# Patient Record
Sex: MALE | Race: WHITE | Employment: FULL TIME | URBAN - METROPOLITAN AREA
[De-identification: names, ages, dates, MRNs, and addresses within clinical notes are randomized per-mention and may not be internally consistent; named-entity substitution may affect disease eponyms.]

---

## 2017-11-03 ENCOUNTER — OFFICE VISIT (OUTPATIENT)
Dept: INTERNAL MEDICINE CLINIC | Facility: CLINIC | Age: 27
End: 2017-11-03

## 2017-11-03 VITALS
BODY MASS INDEX: 31.08 KG/M2 | HEART RATE: 68 BPM | HEIGHT: 67 IN | SYSTOLIC BLOOD PRESSURE: 122 MMHG | DIASTOLIC BLOOD PRESSURE: 78 MMHG | TEMPERATURE: 99 F | WEIGHT: 198 LBS

## 2017-11-03 DIAGNOSIS — Z00.00 ANNUAL PHYSICAL EXAM: Primary | ICD-10-CM

## 2017-11-03 PROCEDURE — 99385 PREV VISIT NEW AGE 18-39: CPT | Performed by: INTERNAL MEDICINE

## 2017-11-03 NOTE — PROGRESS NOTES
HPI:    Patient ID: Irving Evelin is a 27year old male. HPI  Patient is here for physical , no complaints , no chest pain , no shortness of breath . No change in bowel habits no swelling, no light headedness, no visual changes. No surgeries.    Past S Conjunctivae and EOM are normal. Pupils are equal, round, and reactive to light. Right eye exhibits no discharge. Left eye exhibits no discharge. No scleral icterus. Neck: Normal range of motion. Neck supple. No JVD present.  No tracheal deviation present Referrals:  None       #[id number]

## 2017-11-06 ENCOUNTER — NURSE ONLY (OUTPATIENT)
Dept: INTERNAL MEDICINE CLINIC | Facility: CLINIC | Age: 27
End: 2017-11-06

## 2017-11-06 DIAGNOSIS — Z11.3 SCREENING EXAMINATION FOR STD (SEXUALLY TRANSMITTED DISEASE): ICD-10-CM

## 2017-11-06 DIAGNOSIS — Z00.00 ANNUAL PHYSICAL EXAM: Primary | ICD-10-CM

## 2017-11-06 PROCEDURE — 36415 COLL VENOUS BLD VENIPUNCTURE: CPT | Performed by: INTERNAL MEDICINE

## 2017-11-06 NOTE — PROGRESS NOTES
NAME AND  VERIFIED. NO KNOWN ALLERGIES,  PATIENT HERE FOR BLOODWORK. SPECIMEN COLLECTED AND SENT TO LAB.

## 2018-12-27 ENCOUNTER — TELEPHONE (OUTPATIENT)
Dept: INTERNAL MEDICINE CLINIC | Facility: CLINIC | Age: 28
End: 2018-12-27

## 2018-12-27 NOTE — TELEPHONE ENCOUNTER
Patients mother called requesting a referral for her son Karla Good. Ms Deirdre Paul would like to speak specifically only to Dr. Meagan Peterson.

## 2019-01-03 ENCOUNTER — OFFICE VISIT (OUTPATIENT)
Dept: INTERNAL MEDICINE CLINIC | Facility: CLINIC | Age: 29
End: 2019-01-03
Payer: COMMERCIAL

## 2019-01-03 VITALS
WEIGHT: 199 LBS | HEIGHT: 67 IN | SYSTOLIC BLOOD PRESSURE: 120 MMHG | HEART RATE: 71 BPM | OXYGEN SATURATION: 99 % | BODY MASS INDEX: 31.23 KG/M2 | TEMPERATURE: 98 F | RESPIRATION RATE: 19 BRPM | DIASTOLIC BLOOD PRESSURE: 89 MMHG

## 2019-01-03 DIAGNOSIS — E66.3 OVERWEIGHT FOR HEIGHT: ICD-10-CM

## 2019-01-03 DIAGNOSIS — Z72.820 POOR SLEEP: ICD-10-CM

## 2019-01-03 DIAGNOSIS — R10.814 LEFT LOWER QUADRANT ABDOMINAL TENDERNESS WITHOUT REBOUND TENDERNESS: ICD-10-CM

## 2019-01-03 DIAGNOSIS — R03.0 BORDERLINE HIGH BLOOD PRESSURE: Primary | ICD-10-CM

## 2019-01-03 DIAGNOSIS — R10.816 EPIGASTRIC ABDOMINAL TENDERNESS WITHOUT REBOUND TENDERNESS: ICD-10-CM

## 2019-01-03 DIAGNOSIS — R71.8 MICROCYTOSIS: ICD-10-CM

## 2019-01-03 DIAGNOSIS — E78.1 HYPERTRIGLYCERIDEMIA: ICD-10-CM

## 2019-01-03 DIAGNOSIS — R73.9 HYPERGLYCEMIA: ICD-10-CM

## 2019-01-03 PROCEDURE — 99214 OFFICE O/P EST MOD 30 MIN: CPT | Performed by: INTERNAL MEDICINE

## 2019-01-03 NOTE — PROGRESS NOTES
Gonzalo Frank is a 25year old male. Patient presents with:  Blood Pressure: pt presents to clinic c/o elevated BP       HPI:            1 month headaches. Poor sleep last 2 months. Jeannette Pencil Up later than usual, unable to fall asleep.   Wakes up early in [place] denies excessive bruising or bleeding  PSYCH: denies depression or anxiety    Physical Exam:   01/03/19  0916 01/03/19  1859   BP: 118/86 120/89   Pulse: 71    Resp: 19    Temp: 98.4 °F (36.9 °C)    TempSrc: Oral    SpO2: 99%    Weight: 199 lb    Height: 6 mL/min/1.73m2    eGFR AFRICAN AMERICAN 139 > OR = 60 mL/min/1.73m2    BUN/CREATININE RATIO NOT APPLICABLE 6 - 22 (calc)    SODIUM 139 135 - 146 mmol/L    POTASSIUM 4.7 3.5 - 5.3 mmol/L    CHLORIDE 103 98 - 110 mmol/L    CARBON DIOXIDE 28 20 - 31 mmol/L in weight since November 2017. Advised of overweight status, small portions.   Consider dietitian after labs done    (E78.1) Hypertriglyceridemia  Plan: LIPID PANEL, URINALYSIS, ROUTINE       Decrease carbohydrates, sweets    (R71.8) Microcytosis  Plan: IR

## 2019-01-22 ENCOUNTER — HOSPITAL ENCOUNTER (OUTPATIENT)
Dept: CV DIAGNOSTICS | Facility: HOSPITAL | Age: 29
Discharge: HOME | End: 2019-01-22
Attending: INTERNAL MEDICINE
Payer: COMMERCIAL

## 2019-01-22 DIAGNOSIS — R03.0 BORDERLINE HIGH BLOOD PRESSURE: ICD-10-CM

## 2019-01-22 PROCEDURE — 93306 TTE W/DOPPLER COMPLETE: CPT | Performed by: INTERNAL MEDICINE

## 2019-01-31 ENCOUNTER — OFFICE VISIT (OUTPATIENT)
Dept: INTERNAL MEDICINE CLINIC | Facility: CLINIC | Age: 29
End: 2019-01-31
Payer: COMMERCIAL

## 2019-01-31 VITALS
WEIGHT: 197 LBS | HEART RATE: 89 BPM | SYSTOLIC BLOOD PRESSURE: 122 MMHG | BODY MASS INDEX: 30.92 KG/M2 | TEMPERATURE: 98 F | RESPIRATION RATE: 20 BRPM | HEIGHT: 67 IN | DIASTOLIC BLOOD PRESSURE: 82 MMHG | OXYGEN SATURATION: 100 %

## 2019-01-31 DIAGNOSIS — D56.9 THALASSEMIA, UNSPECIFIED TYPE: ICD-10-CM

## 2019-01-31 DIAGNOSIS — R73.9 BORDERLINE HYPERGLYCEMIA: ICD-10-CM

## 2019-01-31 DIAGNOSIS — E78.1 HYPERTRIGLYCERIDEMIA: ICD-10-CM

## 2019-01-31 DIAGNOSIS — E66.3 PATIENT OVERWEIGHT: ICD-10-CM

## 2019-01-31 DIAGNOSIS — R74.8 ELEVATED LIVER ENZYMES: ICD-10-CM

## 2019-01-31 DIAGNOSIS — I51.7 LVH (LEFT VENTRICULAR HYPERTROPHY): Primary | ICD-10-CM

## 2019-01-31 PROCEDURE — 99214 OFFICE O/P EST MOD 30 MIN: CPT | Performed by: INTERNAL MEDICINE

## 2019-01-31 NOTE — PATIENT INSTRUCTIONS
Eating Heart-Healthy Foods  Eating has a big impact on your heart health. In fact, eating healthier can improve several of your heart risks at once. For instance, it helps you manage weight, cholesterol, and blood pressure.  Here are ideas to help you tamar Aim to make these foods staples of your diet. If you have diabetes, you may have different recommendations than what is listed here:  · Fruits and vegetables provide plenty of nutrients without a lot of calories.  At meals, fill half your plate with these f · Choose ingredients that spice up your food without adding calories, fat, or sodium. Try these items: horseradish, hot sauce, lemon, mustard, nonfat salad dressings, and vinegar.  For salt-free herbs and spices, try basil, cilantro, cinnamon, pepper, and r High cholesterol or triglycerides All men ages 28 and older, and younger men at high risk for coronary artery disease At least every 5 years   HIV All men At routine exams   Obesity All men in this age group At routine exams   Syphilis Men at increased ris Pneumococca (PCV13) and Pneumococcal (PPSV23) Men at increased risk for infection – talk with your healthcare provider PCV13: 1 dose ages 23 to 72 (protects against 13 types of pneumococcal bacteria)  PPSV23: 1 to 2 doses through age 59, or 1 dose at 72 or

## 2019-01-31 NOTE — PROGRESS NOTES
Hillary Rivera is a 25year old male. Patient presents with: Follow - Up: pt presents to clinic for follow up on Echo results + BP       HPI:       Sleep better, less cola. Denies history of snoring. Having a healthier diet with more vegetables.   Ch air movement  Heart-S1-S2 normal, no S3, Gr 1-9/3 systolic murmur. Rhythm regular. Abdomen-bowel sounds normal. Liver palpated below right costal margin. No tenderness to palpation. No masses.   Extremities-no edema    Objectives:  Results for orders danita HDL CHOLESTEROL 33 (L) >40 mg/dL    TRIGLYCERIDES 236 (H) <150 mg/dL    LDL-CHOLESTEROL 99 mg/dL (calc)    CHOL/HDLC RATIO 5.1 (H) <5.0 (calc)    NON-HDL CHOLESTEROL 135 (H) <130 mg/dL (calc)   HIV AG AB COMBO   Result Value Ref Range    HIV AG/AB, 4TH file.        Fay Shen MD

## 2019-04-08 ENCOUNTER — OFFICE VISIT (OUTPATIENT)
Dept: INTERNAL MEDICINE CLINIC | Facility: CLINIC | Age: 29
End: 2019-04-08
Payer: COMMERCIAL

## 2019-04-08 VITALS
RESPIRATION RATE: 19 BRPM | BODY MASS INDEX: 31.23 KG/M2 | DIASTOLIC BLOOD PRESSURE: 82 MMHG | HEART RATE: 61 BPM | WEIGHT: 199 LBS | HEIGHT: 67 IN | OXYGEN SATURATION: 100 % | TEMPERATURE: 98 F | SYSTOLIC BLOOD PRESSURE: 120 MMHG

## 2019-04-08 DIAGNOSIS — R03.0 BORDERLINE HIGH BLOOD PRESSURE: ICD-10-CM

## 2019-04-08 DIAGNOSIS — I51.7 LVH (LEFT VENTRICULAR HYPERTROPHY): ICD-10-CM

## 2019-04-08 DIAGNOSIS — D56.9 THALASSEMIA, UNSPECIFIED TYPE: ICD-10-CM

## 2019-04-08 DIAGNOSIS — R74.8 ELEVATED LIVER ENZYMES: ICD-10-CM

## 2019-04-08 DIAGNOSIS — Z00.00 ANNUAL PHYSICAL EXAM: Primary | ICD-10-CM

## 2019-04-08 PROCEDURE — 84466 ASSAY OF TRANSFERRIN: CPT | Performed by: INTERNAL MEDICINE

## 2019-04-08 PROCEDURE — 82607 VITAMIN B-12: CPT | Performed by: INTERNAL MEDICINE

## 2019-04-08 PROCEDURE — 86706 HEP B SURFACE ANTIBODY: CPT | Performed by: INTERNAL MEDICINE

## 2019-04-08 PROCEDURE — 36415 COLL VENOUS BLD VENIPUNCTURE: CPT | Performed by: INTERNAL MEDICINE

## 2019-04-08 PROCEDURE — 80061 LIPID PANEL: CPT | Performed by: INTERNAL MEDICINE

## 2019-04-08 PROCEDURE — 83540 ASSAY OF IRON: CPT | Performed by: INTERNAL MEDICINE

## 2019-04-08 PROCEDURE — 81003 URINALYSIS AUTO W/O SCOPE: CPT | Performed by: INTERNAL MEDICINE

## 2019-04-08 PROCEDURE — 82306 VITAMIN D 25 HYDROXY: CPT | Performed by: INTERNAL MEDICINE

## 2019-04-08 PROCEDURE — 82103 ALPHA-1-ANTITRYPSIN TOTAL: CPT | Performed by: INTERNAL MEDICINE

## 2019-04-08 PROCEDURE — 80050 GENERAL HEALTH PANEL: CPT | Performed by: INTERNAL MEDICINE

## 2019-04-08 PROCEDURE — 86704 HEP B CORE ANTIBODY TOTAL: CPT | Performed by: INTERNAL MEDICINE

## 2019-04-08 PROCEDURE — 86708 HEPATITIS A ANTIBODY: CPT | Performed by: INTERNAL MEDICINE

## 2019-04-08 PROCEDURE — 85060 BLOOD SMEAR INTERPRETATION: CPT | Performed by: INTERNAL MEDICINE

## 2019-04-08 PROCEDURE — 86803 HEPATITIS C AB TEST: CPT | Performed by: INTERNAL MEDICINE

## 2019-04-08 PROCEDURE — 83036 HEMOGLOBIN GLYCOSYLATED A1C: CPT | Performed by: INTERNAL MEDICINE

## 2019-04-08 PROCEDURE — 87340 HEPATITIS B SURFACE AG IA: CPT | Performed by: INTERNAL MEDICINE

## 2019-04-08 PROCEDURE — 83690 ASSAY OF LIPASE: CPT | Performed by: INTERNAL MEDICINE

## 2019-04-08 PROCEDURE — 99395 PREV VISIT EST AGE 18-39: CPT | Performed by: INTERNAL MEDICINE

## 2019-04-08 PROCEDURE — 82150 ASSAY OF AMYLASE: CPT | Performed by: INTERNAL MEDICINE

## 2019-04-08 RX ORDER — LISINOPRIL 2.5 MG/1
TABLET ORAL
Refills: 1 | COMMUNITY
Start: 2019-02-19

## 2019-04-08 NOTE — PROGRESS NOTES
Pt presented to clinic today for blood draw. Per physician able to draw orders. Orders  documented within chart. Pt tolerated lab draw well.  verified.   Orders drawn include: cbc, tsh, iron, hep a b c, a1c, cmp, lipid, alpha, amylase, lipase, vit d, vit

## 2019-04-08 NOTE — PROGRESS NOTES
Jair Judd is a 25year old male. Patient presents with:  Physical: pt presents to clinic for CPX       HPI:         Rashard has been doing cardio exercised 3 times weekly, weights 2 times weekly.  He's decreased carbs in his diet, eating healthier, fee distress  HEENT-pupils equal round, extraocular muscles intact. Conjunctive pink, sclerae anicteric  Neck-supple, no carotid bruits, no adenopathy. Thyroid normal.  Lungs-clear to auscultation  Heart-S1-S2 normal, no S3 or murmur. Rhythm regular.   Abdom food types. Follow-up 4-6 months. Check vitamin B12, LFT's future.          Lucy Rojas MD

## 2019-04-14 ENCOUNTER — TELEPHONE (OUTPATIENT)
Dept: INTERNAL MEDICINE CLINIC | Facility: CLINIC | Age: 29
End: 2019-04-14

## 2019-04-14 NOTE — TELEPHONE ENCOUNTER
Since left for patient that his test results were greatly improved, liver tests were normal, that he did a great job with his diet and exercise.

## 2019-04-14 NOTE — TELEPHONE ENCOUNTER
Advised patient regarding low vitamin D level, may take nature made vitamin D3 2000 units daily; also low normal vitamin B12, to take sublingual vitamin B12 1000 mcg under the tongue at least 3 times weekly.

## 2019-07-17 ENCOUNTER — TELEPHONE (OUTPATIENT)
Dept: INTERNAL MEDICINE CLINIC | Facility: CLINIC | Age: 29
End: 2019-07-17

## 2019-09-11 ENCOUNTER — HOSPITAL ENCOUNTER (OUTPATIENT)
Dept: ULTRASOUND IMAGING | Age: 29
Discharge: HOME | End: 2019-09-11
Attending: INTERNAL MEDICINE
Payer: COMMERCIAL

## 2019-09-11 DIAGNOSIS — R74.8 ELEVATED LIVER ENZYMES: ICD-10-CM

## 2019-09-11 PROCEDURE — 76705 ECHO EXAM OF ABDOMEN: CPT | Performed by: INTERNAL MEDICINE

## 2020-01-20 ENCOUNTER — TELEPHONE (OUTPATIENT)
Dept: INTERNAL MEDICINE CLINIC | Facility: CLINIC | Age: 30
End: 2020-01-20

## 2020-01-20 NOTE — TELEPHONE ENCOUNTER
----- Message from Keyla Klein MD sent at 1/19/2020  1:05 PM CST -----  Please asked patient to follow-up in the next month or 2 for blood pressure and labs.   Thank you.  ----- Message -----  From: Gage Clemens  Sent: 9/16/2019   2:36 PM

## 2021-05-21 ENCOUNTER — TELEPHONE (OUTPATIENT)
Dept: INTERNAL MEDICINE CLINIC | Facility: CLINIC | Age: 31
End: 2021-05-21

## 2021-08-12 ENCOUNTER — APPOINTMENT (OUTPATIENT)
Dept: GENERAL RADIOLOGY | Age: 31
End: 2021-08-12
Attending: EMERGENCY MEDICINE
Payer: COMMERCIAL

## 2021-08-12 ENCOUNTER — HOSPITAL ENCOUNTER (OUTPATIENT)
Age: 31
Discharge: HOME | End: 2021-08-12
Attending: EMERGENCY MEDICINE
Payer: COMMERCIAL

## 2021-08-12 VITALS
RESPIRATION RATE: 18 BRPM | SYSTOLIC BLOOD PRESSURE: 147 MMHG | DIASTOLIC BLOOD PRESSURE: 75 MMHG | OXYGEN SATURATION: 96 % | HEART RATE: 93 BPM | TEMPERATURE: 98 F

## 2021-08-12 DIAGNOSIS — S93.401A MILD SPRAIN OF RIGHT ANKLE, INITIAL ENCOUNTER: Primary | ICD-10-CM

## 2021-08-12 PROCEDURE — 73630 X-RAY EXAM OF FOOT: CPT | Performed by: EMERGENCY MEDICINE

## 2021-08-12 PROCEDURE — 99203 OFFICE O/P NEW LOW 30 MIN: CPT

## 2021-08-12 PROCEDURE — 73610 X-RAY EXAM OF ANKLE: CPT | Performed by: EMERGENCY MEDICINE

## 2021-08-12 PROCEDURE — 99213 OFFICE O/P EST LOW 20 MIN: CPT

## 2021-08-12 NOTE — ED PROVIDER NOTES
Patient Seen in: [facility]      History   Patient presents with: Ankle Pain    Stated Complaint: twisted right ankle    HPI/Subjective:   HPI    25 yo male stepped in a hole and twisted his right ankle. Presents with pain and swelling.  No o Neurological:      General: No focal deficit present. Mental Status: He is alert. Sensory: No sensory deficit.    Psychiatric:         Mood and Affect: Mood normal.         Behavior: Behavior normal.              ED Course   Labs Reviewed - No d

## 2021-08-12 NOTE — ED INITIAL ASSESSMENT (HPI)
PATIENT ARRIVED AMBULATORY ON CRUTCHES TO ROOM C/O RIGHT ANKLE AND RIGHT FOOT PAIN. PATIENT STATES HE INJURED HIMSELF WHILE WORKING TODAY TODAY. TWISTED HIS ANKLE IN A DIVET IN THE GRASS. +SWELLING TO THE RIGHT ANKLE.

## 2021-10-19 PROBLEM — M25.371 INSTABILITY OF RIGHT ANKLE JOINT: Status: ACTIVE | Noted: 2021-10-19

## 2021-10-19 PROBLEM — S93.491A SPRAIN OF ANTERIOR TALOFIBULAR LIGAMENT OF RIGHT ANKLE, INITIAL ENCOUNTER: Status: ACTIVE | Noted: 2021-10-19

## 2021-10-19 PROBLEM — R29.898 ANKLE WEAKNESS: Status: ACTIVE | Noted: 2021-10-19

## 2021-10-19 PROBLEM — S82.64XA CLOSED NONDISPLACED FRACTURE OF LATERAL MALLEOLUS OF RIGHT FIBULA, INITIAL ENCOUNTER: Status: ACTIVE | Noted: 2021-10-19

## 2021-10-19 PROBLEM — M25.571 PAIN IN JOINT, ANKLE AND FOOT, RIGHT: Status: ACTIVE | Noted: 2021-10-19

## 2021-10-29 PROBLEM — M76.71 PERONEAL TENDINITIS OF RIGHT LOWER EXTREMITY: Status: ACTIVE | Noted: 2021-10-29

## 2021-10-29 PROBLEM — M77.51 BONE SPUR OF RIGHT ANKLE: Status: ACTIVE | Noted: 2021-10-19

## 2021-11-12 PROBLEM — E78.01 ESSENTIAL FAMILIAL HYPERCHOLESTEROLEMIA: Status: ACTIVE | Noted: 2019-02-19

## 2021-11-12 PROBLEM — Z11.3 SCREENING EXAMINATION FOR STD (SEXUALLY TRANSMITTED DISEASE): Status: RESOLVED | Noted: 2017-11-06 | Resolved: 2021-11-12

## 2021-11-12 PROBLEM — E55.9 VITAMIN D DEFICIENCY: Status: ACTIVE | Noted: 2019-04-08

## 2021-11-12 PROBLEM — I10 HYPERTENSION, BENIGN: Status: ACTIVE | Noted: 2019-02-19

## 2021-11-12 PROBLEM — K76.0 FATTY LIVER: Status: ACTIVE | Noted: 2019-09-11

## 2021-11-12 PROBLEM — Z00.00 ANNUAL PHYSICAL EXAM: Status: RESOLVED | Noted: 2017-11-03 | Resolved: 2021-11-12

## 2023-02-15 ENCOUNTER — PATIENT OUTREACH (OUTPATIENT)
Dept: CASE MANAGEMENT | Age: 33
End: 2023-02-15

## 2023-02-15 NOTE — PROCEDURES
The office order for PCP request is Approved and finalized on February 15, 2023.     Thanks,  [facility] [employer]